# Patient Record
Sex: FEMALE | Race: WHITE
[De-identification: names, ages, dates, MRNs, and addresses within clinical notes are randomized per-mention and may not be internally consistent; named-entity substitution may affect disease eponyms.]

---

## 2023-01-30 ENCOUNTER — HOSPITAL ENCOUNTER (EMERGENCY)
Dept: HOSPITAL 75 - ER FS | Age: 43
Discharge: HOME | End: 2023-01-30
Payer: COMMERCIAL

## 2023-01-30 VITALS — SYSTOLIC BLOOD PRESSURE: 170 MMHG | DIASTOLIC BLOOD PRESSURE: 92 MMHG

## 2023-01-30 VITALS — BODY MASS INDEX: 31.84 KG/M2 | HEIGHT: 68.98 IN | WEIGHT: 214.95 LBS

## 2023-01-30 DIAGNOSIS — I10: ICD-10-CM

## 2023-01-30 DIAGNOSIS — Z28.310: ICD-10-CM

## 2023-01-30 DIAGNOSIS — F17.210: ICD-10-CM

## 2023-01-30 DIAGNOSIS — I49.3: Primary | ICD-10-CM

## 2023-01-30 LAB
ALBUMIN SERPL-MCNC: 4.4 GM/DL (ref 3.2–4.5)
ALP SERPL-CCNC: 83 U/L (ref 40–136)
ALT SERPL-CCNC: 19 U/L (ref 0–55)
APTT BLD: 30 SEC (ref 24–35)
BASOPHILS # BLD AUTO: 0.1 10^3/UL (ref 0–0.1)
BASOPHILS NFR BLD AUTO: 1 % (ref 0–10)
BILIRUB SERPL-MCNC: < 0.2 MG/DL (ref 0.1–1)
BUN/CREAT SERPL: 19
CALCIUM SERPL-MCNC: 9.3 MG/DL (ref 8.5–10.1)
CHLORIDE SERPL-SCNC: 107 MMOL/L (ref 98–107)
CO2 SERPL-SCNC: 20 MMOL/L (ref 21–32)
CREAT SERPL-MCNC: 0.62 MG/DL (ref 0.6–1.3)
EOSINOPHIL # BLD AUTO: 0.4 10^3/UL (ref 0–0.3)
EOSINOPHIL NFR BLD AUTO: 4 % (ref 0–10)
GFR SERPLBLD BASED ON 1.73 SQ M-ARVRAT: 113 ML/MIN
GLUCOSE SERPL-MCNC: 79 MG/DL (ref 70–105)
HCT VFR BLD CALC: 42 % (ref 35–52)
HGB BLD-MCNC: 14.5 G/DL (ref 11.5–16)
INR PPP: 0.9 (ref 0.8–1.4)
LIPASE SERPL-CCNC: 92 U/L (ref 8–78)
LYMPHOCYTES # BLD AUTO: 3.6 10^3/UL (ref 1–4)
LYMPHOCYTES NFR BLD AUTO: 34 % (ref 12–44)
MAGNESIUM SERPL-MCNC: 2.2 MG/DL (ref 1.6–2.4)
MANUAL DIFFERENTIAL PERFORMED BLD QL: NO
MCH RBC QN AUTO: 33 PG (ref 25–34)
MCHC RBC AUTO-ENTMCNC: 34 G/DL (ref 32–36)
MCV RBC AUTO: 95 FL (ref 80–99)
MONOCYTES # BLD AUTO: 0.6 10^3/UL (ref 0–1)
MONOCYTES NFR BLD AUTO: 6 % (ref 0–12)
NEUTROPHILS # BLD AUTO: 5.7 10^3/UL (ref 1.8–7.8)
NEUTROPHILS NFR BLD AUTO: 55 % (ref 42–75)
PLATELET # BLD: 289 10^3/UL (ref 130–400)
PMV BLD AUTO: 9.2 FL (ref 9–12.2)
POTASSIUM SERPL-SCNC: 4.3 MMOL/L (ref 3.6–5)
PROT SERPL-MCNC: 6.9 GM/DL (ref 6.4–8.2)
PROTHROMBIN TIME: 12.3 SEC (ref 12.2–14.7)
SODIUM SERPL-SCNC: 139 MMOL/L (ref 135–145)
WBC # BLD AUTO: 10.4 10^3/UL (ref 4.3–11)

## 2023-01-30 PROCEDURE — 83690 ASSAY OF LIPASE: CPT

## 2023-01-30 PROCEDURE — 93041 RHYTHM ECG TRACING: CPT

## 2023-01-30 PROCEDURE — 36415 COLL VENOUS BLD VENIPUNCTURE: CPT

## 2023-01-30 PROCEDURE — 85610 PROTHROMBIN TIME: CPT

## 2023-01-30 PROCEDURE — 71045 X-RAY EXAM CHEST 1 VIEW: CPT

## 2023-01-30 PROCEDURE — 84484 ASSAY OF TROPONIN QUANT: CPT

## 2023-01-30 PROCEDURE — 85730 THROMBOPLASTIN TIME PARTIAL: CPT

## 2023-01-30 PROCEDURE — 85025 COMPLETE CBC W/AUTO DIFF WBC: CPT

## 2023-01-30 PROCEDURE — 93005 ELECTROCARDIOGRAM TRACING: CPT

## 2023-01-30 PROCEDURE — 80053 COMPREHEN METABOLIC PANEL: CPT

## 2023-01-30 PROCEDURE — 83880 ASSAY OF NATRIURETIC PEPTIDE: CPT

## 2023-01-30 PROCEDURE — 83735 ASSAY OF MAGNESIUM: CPT

## 2023-01-30 NOTE — DIAGNOSTIC IMAGING REPORT
INDICATION: Chest pain



Portable AP view of the chest is obtained.



COMPARISON: No previous study is available for comparison at this

time.



FINDINGS: Heart size and pulmonary vasculature are within normal

limits, and the lungs are clear, bilaterally.  



IMPRESSION: Unremarkable chest.   



Dictated by: 



  Dictated on workstation # [id number]

## 2023-01-30 NOTE — ED CARDIAC GENERAL
History of Present Illness


General


Stated Complaint:  ANXIETY


Source:  patient, EMS


Exam Limitations:  no limitations





History of Present Illness


Date Seen by Provider:  2023


Time Seen by Provider:  09:53


Initial Comments


42-year-old female with past medical history most notable for hypertension as 

well as intermittent tachycardia and bradycardia coming in via EMS from her work

due to what the patient states is an anxiety attack.  She was asking leave from 

work for them her boss for medical reasons to see her cardiologist which she 

needs to do every 3 months, and her request was denied.  She states she got very

worked up and her heart rate was around 160.  She states she is being worked up 

for this for the past couple of years.  She states her heart rate will be very 

elevated, and other times it will be very low.  The only medication she takes is

atenolol.  She states she has had normal stress test, echo, Holter monitor 

evaluation thus far.  She had another Holter monitor a couple days ago that she 

has not seen the results for yet.  Denies any chest pain, shortness of breath, 

abdominal pain, nausea, vomiting, diarrhea, focal weakness or numbness, or any 

other concerns.





Allergies and Home Medications


Allergies


Coded Allergies:  


     Acetaminophen (Unverified  Allergy, 9/4/10)


     Azithromycin (Unverified  Allergy, 9/4/10)


     Doxycycline (Unverified  Allergy, 9/4/10)


     Oxycodone (Unverified  Allergy, 9/4/10)


     clarithromycin (Unverified  Allergy, 9/4/10)


Uncoded Allergies:  


     ERETHOMYCIN (Allergy, 9/4/10)





Patient Home Medication List


Home Medication List Reviewed:  Yes


Meclizine Hcl (Antivert 25 Mg) 25 Mg Tablet, 1 EACH PO QID


   Prescribed by: CHAO FARFAN on 12 1709


Metoprolol Tartrate (Metoprolol Tartrate 25 Mg) 25 Mg Tablet, 1 TAB PO BID, 

(Reported)


   Entered as Reported by: RONIT SILVA on 9/4/10 1156





Review of Systems


Review of Systems


Constitutional:  No fever


EENTM:  No Symptoms Reported


Respiratory:  No Symptoms Reported


Cardiovascular:  See HPI


Gastrointestinal:  No Symptoms Reported


Genitourinary:  No Symptoms Reported


Musculoskeletal:  no symptoms reported


Skin:  no symptoms reported


Psychiatric/Neurological:  No Symptoms Reported


Endocrine:  No Symptoms Reported


Hematologic/Lymphatic:  No Symptoms Reported





All Other Systems Reviewed


Negative Unless Noted:  Yes





Past Medical-Social-Family Hx


Patient Social History


Tobacco Use?:  Yes


Tobacco type used:  Cigarettes


Substance use?:  No


Alcohol Use?:  Yes





Past Medical History


Surgery/Hospitalization HX:  


Ovarian cyst removal


Surgeries:  Yes


Appendectomy


Reproductive Disorders:  Yes (ENDOMETRIOSIS)





Physical Exam


Vital Signs


Capillary Refill :


Height, Weight, BMI


Height: '"


Weight: lbs. oz. kg;  BMI


Method:Stated


General Appearance:  No Apparent Distress, WD/WN


HEENT:  PERRL/EOMI, Normal ENT Inspection, Pharynx Normal


Neck:  Full Range of Motion, Normal Inspection, Non Tender, Supple


Respiratory:  Chest Non Tender, Lungs Clear, Normal Breath Sounds, No Accessory 

Muscle Use, No Respiratory Distress


Cardiovascular:  Regular Rate, Rhythm, No Edema, Normal Peripheral Pulses


Gastrointestinal:  Normal Bowel Sounds, Non Tender, Soft; No Distended, No 

Guarding


Extremity:  Normal Capillary Refill, Normal Inspection, Normal Range of Motion, 

Non Tender, No Calf Tenderness, No Pedal Edema


Neurologic/Psychiatric:  Alert, No Motor/Sensory Deficits, Normal Mood/Affect


Skin:  Normal Color, Warm/Dry





Progress/Results/Core Measures


Results/Orders


Lab Results





Laboratory Tests








Test


 23


10:00 Range/Units


 


 


White Blood Count


 10.4 


 4.3-11.0


10^3/uL


 


Red Blood Count


 4.42 


 3.80-5.11


10^6/uL


 


Hemoglobin 14.5  11.5-16.0  g/dL


 


Hematocrit 42  35-52  %


 


Mean Corpuscular Volume 95  80-99  fL


 


Mean Corpuscular Hemoglobin 33  25-34  pg


 


Mean Corpuscular Hemoglobin


Concent 34 


 32-36  g/dL





 


Red Cell Distribution Width 12.7  10.0-14.5  %


 


Platelet Count


 289 


 130-400


10^3/uL


 


Mean Platelet Volume 9.2  9.0-12.2  fL


 


Immature Granulocyte % (Auto) 1   %


 


Neutrophils (%) (Auto) 55  42-75  %


 


Lymphocytes (%) (Auto) 34  12-44  %


 


Monocytes (%) (Auto) 6  0-12  %


 


Eosinophils (%) (Auto) 4  0-10  %


 


Basophils (%) (Auto) 1  0-10  %


 


Neutrophils # (Auto)


 5.7 


 1.8-7.8


10^3/uL


 


Lymphocytes # (Auto)


 3.6 


 1.0-4.0


10^3/uL


 


Monocytes # (Auto)


 0.6 


 0.0-1.0


10^3/uL


 


Eosinophils # (Auto)


 0.4 H


 0.0-0.3


10^3/uL


 


Basophils # (Auto)


 0.1 


 0.0-0.1


10^3/uL


 


Immature Granulocyte # (Auto)


 0.1 


 0.0-0.1


10^3/uL


 


Prothrombin Time 12.3  12.2-14.7  SEC


 


INR Comment 0.9  0.8-1.4  


 


Activated Partial


Thromboplast Time 30 


 24-35  SEC





 


Sodium Level 139  135-145  MMOL/L


 


Potassium Level 4.3  3.6-5.0  MMOL/L


 


Chloride Level 107    MMOL/L


 


Carbon Dioxide Level 20 L 21-32  MMOL/L


 


Anion Gap 12  5-14  MMOL/L


 


Blood Urea Nitrogen 12  7-18  MG/DL


 


Creatinine


 0.62 


 0.60-1.30


MG/DL


 


Estimat Glomerular Filtration


Rate 113 


  





 


BUN/Creatinine Ratio 19   


 


Glucose Level 79    MG/DL


 


Calcium Level 9.3  8.5-10.1  MG/DL


 


Corrected Calcium 9.0  8.5-10.1  MG/DL


 


Magnesium Level 2.2  1.6-2.4  MG/DL


 


Total Bilirubin < 0.2  0.1-1.0  MG/DL


 


Aspartate Amino Transf


(AST/SGOT) 17 


 5-34  U/L





 


Alanine Aminotransferase


(ALT/SGPT) 19 


 0-55  U/L





 


Alkaline Phosphatase 83    U/L


 


Troponin I < 0.30  <0.30  NG/ML


 


Pro-B-Type Natriuretic Peptide 80.5  <125.0  PG/ML


 


Total Protein 6.9  6.4-8.2  GM/DL


 


Albumin 4.4  3.2-4.5  GM/DL


 


Lipase 92 H 8-78  U/L








My Orders





Orders - JOHANNA FUCHS MD


Cbc With Automated Diff (23 09:53)


Magnesium (23 09:53)


Chest 1 View Ap/Pa Only (23 09:53)


Ekg Tracing (23 09:53)


Comprehensive Metabolic Panel (23 09:53)


Protime With Inr (23 09:53)


Partial Thromboplastin Time (23 09:53)


O2 (23 09:53)


Monitor-Rhythm Ecg Trace Only (23 09:53)


Ed Iv/Invasive Line Start (23 09:53)


Lipase (23 09:53)


Troponin I Fs (23 09:53)


Probnp Fs (23 09:53)








Progress


Progress Note :  


Progress Note


42-year-old female coming in due to palpitations earlier after having a 

disagreement with her boss at work.  ABCs were intact and vitals were stable on 

presentation.  Physical exam reassuring with no focal abnormalities.  EKG with 

no acute ischemic changes but she does have frequent PVCs interpreted by me 

which likely is symptomatic from.  An IV was placed and basic labs were obtained

including cardiac biomarkers.  Her electrolytes are normal including her 

potassium and magnesium.  Troponin negative.  Chest x-ray clear with no acute 

abnormalities.  She has follow-up here shortly with her cardiologist and 

assessment symptoms been ongoing for years.  I believe she is otherwise stable 

for discharge with outpatient follow-up.  She was sent home with strict return 

precautions.


Initial ECG Impression Date:  2023


Initial ECG Impression Time:  09:53


Initial ECG Rate:  87


Initial ECG Rhythm:  Normal Sinus


Comment


Narrow QRS, normal axis, no significant ST changes or T wave abnormalities, 

frequent PVCs in a bigeminal pattern





Diagnostic Imaging





   Diagonstic Imaging:  Xray (chest)


Comments


                 [facility].


                                [place], [place]





NAME:   ALEXANDRO VALDES


MED REC#:   [id number]


ACCOUNT#:   [id number]


PT STATUS:   REG ER


:   1980


PHYSICIAN:   JOHANNA FUCHS MD


ADMIT DATE:   23/ER FS


                                  ***Signed***


Date of Exam:23





CHEST 1 VIEW AP/PA ONLY








INDICATION: Chest pain





Portable AP view of the chest is obtained.





COMPARISON: No previous study is available for comparison at this


time.





FINDINGS: Heart size and pulmonary vasculature are within normal


limits, and the lungs are clear, bilaterally.  





IMPRESSION: Unremarkable chest.   





Dictated by: 





  Dictated on workstation # [id number]








Dict:   23 1019


Trans:   23 1035


CV [id number]





Interpreted by:     MIRELA MARIN MD


Electronically signed by: MIRELA MARIN MD 23 1035





Departure


Impression





   Primary Impression:  


   PVCs (premature ventricular contractions)


Disposition:   HOME, SELF-CARE


Condition:  Stable





Departure-Patient Inst.


Decision time for Depature:  10:47


Referrals:  


GEE ROLLINS (PCP)


Primary Care Physician


Patient Instructions:  Palpitations





Add. Discharge Instructions:  


You are having frequent PVCs which are kind of like skipped beats.  Were not 

seeing any evidence of any type of heart attack or anything more life-

threatening.  If you begin having severe chest pain, severe shortness of breath,

leg swelling where 1 leg is getting much bigger and more red than the other, 

then we want you to come back to the ER.  Otherwise, please follow back up with 

the cardiologist as you stated.


Work/School Note:  Work Release Form   Date Seen in the Emergency Department:  

2023


   Return to Work:  2023


   Restrictions:  No Restrictions











JOHANNA FUCHS MD          2023 09:56